# Patient Record
Sex: FEMALE | Race: WHITE | NOT HISPANIC OR LATINO | Employment: OTHER | URBAN - METROPOLITAN AREA
[De-identification: names, ages, dates, MRNs, and addresses within clinical notes are randomized per-mention and may not be internally consistent; named-entity substitution may affect disease eponyms.]

---

## 2022-08-07 PROBLEM — N18.30 STAGE 3 CHRONIC KIDNEY DISEASE: Status: ACTIVE | Noted: 2022-08-07

## 2022-12-24 ENCOUNTER — APPOINTMENT (OUTPATIENT)
Dept: RADIOLOGY | Facility: MEDICAL CENTER | Age: 87
End: 2022-12-24
Attending: EMERGENCY MEDICINE
Payer: COMMERCIAL

## 2022-12-24 ENCOUNTER — HOSPITAL ENCOUNTER (EMERGENCY)
Facility: MEDICAL CENTER | Age: 87
End: 2022-12-24
Attending: EMERGENCY MEDICINE
Payer: COMMERCIAL

## 2022-12-24 VITALS
RESPIRATION RATE: 16 BRPM | HEART RATE: 88 BPM | DIASTOLIC BLOOD PRESSURE: 87 MMHG | TEMPERATURE: 98.4 F | OXYGEN SATURATION: 94 % | SYSTOLIC BLOOD PRESSURE: 157 MMHG | BODY MASS INDEX: 26.8 KG/M2 | HEIGHT: 64 IN | WEIGHT: 157 LBS

## 2022-12-24 DIAGNOSIS — E86.0 DEHYDRATION: ICD-10-CM

## 2022-12-24 DIAGNOSIS — R11.2 NAUSEA AND VOMITING, UNSPECIFIED VOMITING TYPE: ICD-10-CM

## 2022-12-24 LAB
ALBUMIN SERPL BCP-MCNC: 3.9 G/DL (ref 3.2–4.9)
ALBUMIN/GLOB SERPL: 1.3 G/DL
ALP SERPL-CCNC: 109 U/L (ref 30–99)
ALT SERPL-CCNC: 10 U/L (ref 2–50)
ANION GAP SERPL CALC-SCNC: 13 MMOL/L (ref 7–16)
APPEARANCE UR: CLEAR
AST SERPL-CCNC: 18 U/L (ref 12–45)
BASOPHILS # BLD AUTO: 0.5 % (ref 0–1.8)
BASOPHILS # BLD: 0.05 K/UL (ref 0–0.12)
BILIRUB SERPL-MCNC: 1 MG/DL (ref 0.1–1.5)
BILIRUB UR QL STRIP.AUTO: NEGATIVE
BUN SERPL-MCNC: 36 MG/DL (ref 8–22)
CALCIUM ALBUM COR SERPL-MCNC: 9.6 MG/DL (ref 8.5–10.5)
CALCIUM SERPL-MCNC: 9.5 MG/DL (ref 8.5–10.5)
CHLORIDE SERPL-SCNC: 104 MMOL/L (ref 96–112)
CO2 SERPL-SCNC: 21 MMOL/L (ref 20–33)
COLOR UR: YELLOW
CREAT SERPL-MCNC: 1.06 MG/DL (ref 0.5–1.4)
EOSINOPHIL # BLD AUTO: 0.17 K/UL (ref 0–0.51)
EOSINOPHIL NFR BLD: 1.7 % (ref 0–6.9)
ERYTHROCYTE [DISTWIDTH] IN BLOOD BY AUTOMATED COUNT: 44.9 FL (ref 35.9–50)
GFR SERPLBLD CREATININE-BSD FMLA CKD-EPI: 48 ML/MIN/1.73 M 2
GLOBULIN SER CALC-MCNC: 3 G/DL (ref 1.9–3.5)
GLUCOSE SERPL-MCNC: 108 MG/DL (ref 65–99)
GLUCOSE UR STRIP.AUTO-MCNC: NEGATIVE MG/DL
HCT VFR BLD AUTO: 42.9 % (ref 37–47)
HGB BLD-MCNC: 14.2 G/DL (ref 12–16)
IMM GRANULOCYTES # BLD AUTO: 0.03 K/UL (ref 0–0.11)
IMM GRANULOCYTES NFR BLD AUTO: 0.3 % (ref 0–0.9)
KETONES UR STRIP.AUTO-MCNC: ABNORMAL MG/DL
LACTATE SERPL-SCNC: 1.9 MMOL/L (ref 0.5–2)
LEUKOCYTE ESTERASE UR QL STRIP.AUTO: NEGATIVE
LYMPHOCYTES # BLD AUTO: 2.84 K/UL (ref 1–4.8)
LYMPHOCYTES NFR BLD: 28.6 % (ref 22–41)
MCH RBC QN AUTO: 31.6 PG (ref 27–33)
MCHC RBC AUTO-ENTMCNC: 33.1 G/DL (ref 33.6–35)
MCV RBC AUTO: 95.3 FL (ref 81.4–97.8)
MICRO URNS: ABNORMAL
MONOCYTES # BLD AUTO: 0.74 K/UL (ref 0–0.85)
MONOCYTES NFR BLD AUTO: 7.4 % (ref 0–13.4)
NEUTROPHILS # BLD AUTO: 6.11 K/UL (ref 2–7.15)
NEUTROPHILS NFR BLD: 61.5 % (ref 44–72)
NITRITE UR QL STRIP.AUTO: NEGATIVE
NRBC # BLD AUTO: 0 K/UL
NRBC BLD-RTO: 0 /100 WBC
PH UR STRIP.AUTO: 7 [PH] (ref 5–8)
PLATELET # BLD AUTO: 210 K/UL (ref 164–446)
PMV BLD AUTO: 10.7 FL (ref 9–12.9)
POTASSIUM SERPL-SCNC: 4.5 MMOL/L (ref 3.6–5.5)
PROT SERPL-MCNC: 6.9 G/DL (ref 6–8.2)
PROT UR QL STRIP: NEGATIVE MG/DL
RBC # BLD AUTO: 4.5 M/UL (ref 4.2–5.4)
RBC UR QL AUTO: NEGATIVE
SODIUM SERPL-SCNC: 138 MMOL/L (ref 135–145)
SP GR UR STRIP.AUTO: 1.02
UROBILINOGEN UR STRIP.AUTO-MCNC: 1 MG/DL
WBC # BLD AUTO: 9.9 K/UL (ref 4.8–10.8)

## 2022-12-24 PROCEDURE — 99285 EMERGENCY DEPT VISIT HI MDM: CPT

## 2022-12-24 PROCEDURE — 74177 CT ABD & PELVIS W/CONTRAST: CPT

## 2022-12-24 PROCEDURE — U0005 INFEC AGEN DETEC AMPLI PROBE: HCPCS

## 2022-12-24 PROCEDURE — 71045 X-RAY EXAM CHEST 1 VIEW: CPT

## 2022-12-24 PROCEDURE — 700111 HCHG RX REV CODE 636 W/ 250 OVERRIDE (IP)

## 2022-12-24 PROCEDURE — 81003 URINALYSIS AUTO W/O SCOPE: CPT

## 2022-12-24 PROCEDURE — 700105 HCHG RX REV CODE 258: Performed by: EMERGENCY MEDICINE

## 2022-12-24 PROCEDURE — 700117 HCHG RX CONTRAST REV CODE 255: Performed by: EMERGENCY MEDICINE

## 2022-12-24 PROCEDURE — 85025 COMPLETE CBC W/AUTO DIFF WBC: CPT

## 2022-12-24 PROCEDURE — U0003 INFECTIOUS AGENT DETECTION BY NUCLEIC ACID (DNA OR RNA); SEVERE ACUTE RESPIRATORY SYNDROME CORONAVIRUS 2 (SARS-COV-2) (CORONAVIRUS DISEASE [COVID-19]), AMPLIFIED PROBE TECHNIQUE, MAKING USE OF HIGH THROUGHPUT TECHNOLOGIES AS DESCRIBED BY CMS-2020-01-R: HCPCS

## 2022-12-24 PROCEDURE — 87040 BLOOD CULTURE FOR BACTERIA: CPT | Mod: 91

## 2022-12-24 PROCEDURE — 80053 COMPREHEN METABOLIC PANEL: CPT

## 2022-12-24 PROCEDURE — 83605 ASSAY OF LACTIC ACID: CPT

## 2022-12-24 PROCEDURE — 36415 COLL VENOUS BLD VENIPUNCTURE: CPT

## 2022-12-24 PROCEDURE — 96374 THER/PROPH/DIAG INJ IV PUSH: CPT

## 2022-12-24 PROCEDURE — 70450 CT HEAD/BRAIN W/O DYE: CPT

## 2022-12-24 PROCEDURE — 87086 URINE CULTURE/COLONY COUNT: CPT

## 2022-12-24 RX ORDER — TROLAMINE SALICYLATE 10 G/100G
1 CREAM TOPICAL
COMMUNITY

## 2022-12-24 RX ORDER — SODIUM CHLORIDE 9 MG/ML
500 INJECTION, SOLUTION INTRAVENOUS ONCE
Status: COMPLETED | OUTPATIENT
Start: 2022-12-24 | End: 2022-12-24

## 2022-12-24 RX ORDER — MIDAZOLAM HYDROCHLORIDE 1 MG/ML
INJECTION INTRAMUSCULAR; INTRAVENOUS
Status: COMPLETED
Start: 2022-12-24 | End: 2022-12-24

## 2022-12-24 RX ORDER — MIDAZOLAM HYDROCHLORIDE 1 MG/ML
1 INJECTION INTRAMUSCULAR; INTRAVENOUS ONCE
Status: COMPLETED | OUTPATIENT
Start: 2022-12-24 | End: 2022-12-24

## 2022-12-24 RX ADMIN — IOHEXOL 80 ML: 350 INJECTION, SOLUTION INTRAVENOUS at 18:30

## 2022-12-24 RX ADMIN — MIDAZOLAM HYDROCHLORIDE 1 MG: 1 INJECTION INTRAMUSCULAR; INTRAVENOUS at 18:40

## 2022-12-24 RX ADMIN — SODIUM CHLORIDE 500 ML: 9 INJECTION, SOLUTION INTRAVENOUS at 18:19

## 2022-12-24 RX ADMIN — MIDAZOLAM HYDROCHLORIDE 1 MG: 1 INJECTION, SOLUTION INTRAMUSCULAR; INTRAVENOUS at 18:40

## 2022-12-24 ASSESSMENT — FIBROSIS 4 INDEX: FIB4 SCORE: 2.63

## 2022-12-24 NOTE — Clinical Note
Please wait for the point-of-care testing for COVID, RSV, flu.  Do not discharge until that is done and negative

## 2022-12-25 LAB
SARS-COV-2 RNA RESP QL NAA+PROBE: NOTDETECTED
SPECIMEN SOURCE: NORMAL

## 2022-12-25 NOTE — ED PROVIDER NOTES
ED Provider Note    CHIEF COMPLAINT  Decreased activity  Not herself      LIMITATION TO HISTORY   Select: Behavior    HPI  Ashley Carrington is a 89 y.o. female who is here with her nephew.  He provides most of the history as the patient is 89 and has difficulty giving history which is her baseline as per the nephew.  She is here primarily because she is not herself.  He states that she is really less active today.  He describes it as not ambulating as much and being interactive.  Typically her baseline is that she is very active she is cantankerous she is hard of hearing and she is very agitated with altered mental status but this is typical of her.  Today she is less active.  Family was going to take her home for Jelani dinner.  She was at assisted living.  However, because of her lack of activity and because they could not do testing they were recommended to come here.    All that the nephew can tell me is that she vomited this morning and has a decreased appetite and decreased activity.  There is been no notes of diarrhea.  He states that that she had been diagnosed with UTI in the past although he has had no definitive proof that he can recall.  There is no history of head injury.    Nephew is here for outside historian.  In today reviewed the chart the patient has not been hospitalized here.  Possible UTI in the past.      REVIEW OF SYSTEMS  Review of systems are unobtainable due to the patient's past med history of dementia and this is her baseline as per nephew    PAST MEDICAL HISTORY   has a past medical history of Cataracts, bilateral, Dementia (HCC), Glaucoma, Memory loss, Psoriasis, and Renal insufficiency.    SURGICAL HISTORY  patient denies any surgical history    FAMILY HISTORY  No family history on file.    SOCIAL HISTORY  Social History     Tobacco Use    Smoking status: Never    Smokeless tobacco: Never   Substance and Sexual Activity    Alcohol use: Not on file    Drug use: Not on file    Sexual  "activity: Not on file       CURRENT MEDICATIONS  Home Medications       Reviewed by Aleksey Palafox R.N. (Registered Nurse) on 12/24/22 at 1712  Med List Status: <None>     Medication Last Dose Status   acetaminophen (TYLENOL) 325 MG Tab  Active   acetaminophen (TYLENOL) 500 MG Tab  Active   Apoaequorin (PREVAGEN) 10 MG Cap  Active   carboxymethylcellulose (REFRESH TEARS) 0.5 % Solution  Active   diclofenac sodium (VOLTAREN) 1 % Gel  Active   FLUoxetine (PROZAC) 10 MG Cap  Active   gabapentin (NEURONTIN) 100 MG Cap  Active   hydrophilic ointment (AQUAPHOR) Ointment  Active   melatonin 5 mg Tab  Active   meloxicam (MOBIC) 7.5 MG Tab  Active   Multiple Vitamins-Minerals (PRESERVISION AREDS) Cap  Active   triamcinolone acetonide (KENALOG) 0.1 % Cream  Active                    ALLERGIES  Allergies   Allergen Reactions    Sulfa Drugs        PHYSICAL EXAM  VITAL SIGNS: /63   Pulse 79   Temp (!) 35.6 °C (96.1 °F) (Temporal)   Resp 16   Ht 1.626 m (5' 4\")   SpO2 92%   BMI 27.02 kg/m²    General: Elderly female who with glasses in soft restraints.  Resting comfortably but agitated when spoken to  Eyes patient's pupils are reactive.  Ear nose throat oropharynx appears dry.  There is no tongue lacerations noted  Pulmonary lungs are clear bilaterally no wheeze no rales  GI nondistended nontender soft    no dysuria dermatologic no rash abrasions neurologic she moves all extremities.      LABS  Abnormal Labs Reviewed   CBC WITH DIFFERENTIAL - Abnormal; Notable for the following components:       Result Value    MCHC 33.1 (*)     All other components within normal limits   COMP METABOLIC PANEL - Abnormal; Notable for the following components:    Glucose 108 (*)     Bun 36 (*)     Alkaline Phosphatase 109 (*)     All other components within normal limits   URINALYSIS - Abnormal; Notable for the following components:    Ketones Trace (*)     All other components within normal limits    Narrative:     Indication for " culture:->Evaluation for sepsis without a  clear source of infection   ESTIMATED GFR - Abnormal; Notable for the following components:    GFR (CKD-EPI) 48 (*)     All other components within normal limits       RADIOLOGY  I have independently interpreted the diagnostic imaging associated with this visit and am waiting the final reading from the radiologist v you are able to look at x-rays.  X-ray showed no infiltrate or effusion I show this with the nephew and again CT head and CT abdomen are pending.    COURSE & MEDICAL DECISION MAKING  Pertinent Labs & Imaging studies reviewed. (See chart for details)    Is a 89-year-old female who comes in today with a chief complaint of decreased activity sent here from the assisted living facility for concern.  Decreased activity as per family and 1 episode of vomiting earlier in the morning.  Differential exceedingly wide from hematologic infectious causes pneumonia and UTI diverticulitis among others.  She could have metabolic abnormalities such as hyponatremia she could have hematologic Abdomi significant anemia among others    The patient this point my exam has no specific finding.  I find any cellulitis or increased erythema in her extremities.    Plan  CBC  Metabolic panel  Urinalysis.  CT abdomen pelvis noted CT head    Discussed with the great nephew at length.  He is agreeable to the plan  Escalation of care considered, and ultimately not performed: IV fluids.  Point the patient may be just simply dehydrated from a stomach flu or food poisoning.  Therefore the patient may benefit from IV fluids and she has not been able to drink p.o. or follow commands due to her dementia.    No barriers to care at this time    Diagnostic test included CT scans lab work.       7:57 PM  Reviewed the x-ray results of the radiology lab work.  This point the patient had no acute findings unfortunate she did require 1 mg of Versed as the patient was very difficult to get appropriate studies  and tests.  The test ended being negative and I feel the patient can be safely discharged home    I spoken to the nephew at length.  We are waiting for the COVID testing once her COVID testing is done and resulted then we can discharge the patient safely we believe at this point most likely secondary to food poisoning but I do difficult to say but at the patient's age recommended IV fluids.  The patient be discharged home.  Anticipating negative testing.         @HYDRATION: Based on the patient's presentation of Abnormal Fluid Loss and Inability to take oral fluids the patient was given IV fluids. IV Hydration was used because oral hydration was not as rapid as required. Upon recheck following hydration, the patient was improved.@    The patient will not drink alcohol nor drive with prescribed medications. The patient will return for worsening symptoms and is stable at the time of discharge. The patient verbalizes understanding and will comply.    FINAL IMPRESSION  Vomiting  Decreased activity       Electronically signed by: Reji Manley M.D., 12/24/2022 6:08 PM    Asked her to bring her tomorrow if she is not improved.  Or if she worsens.

## 2022-12-25 NOTE — DISCHARGE INSTRUCTIONS
Please come back tomorrow if you are not better  Please come back anytime if you get worse.  Please make sure you are drinking fluids to stay hydrated.

## 2022-12-25 NOTE — ED TRIAGE NOTES
BIBA from memory care for ALOC and agitation. Per EMS pt acting similar to UTI in past.  Baseline aox3. Now aox2 and agitated.  VSS,

## 2022-12-25 NOTE — ED NOTES
Pt unable to hold still in imaging d/t agitaion.  ERP aware and @ CT w/ pt.  Per ERP give 1MG iV versed.  Pt medicated w/ 1 MG iv versed.  Imaging completed and pt back in rm on monitor w/ nephew @ bedside.

## 2022-12-25 NOTE — ED NOTES
Med rec updated and complete. Allergies reviewed and updated. No reactions listed.  Listed provided per [facility]. No [place] arrived with pt.  No antibiotics noted.      [place] [facility]  [phone]

## 2022-12-26 LAB
BACTERIA BLD CULT: ABNORMAL
BACTERIA BLD CULT: ABNORMAL
BACTERIA UR CULT: NORMAL
SIGNIFICANT IND 70042: ABNORMAL
SIGNIFICANT IND 70042: NORMAL
SITE SITE: ABNORMAL
SITE SITE: NORMAL
SOURCE SOURCE: ABNORMAL
SOURCE SOURCE: NORMAL

## 2022-12-27 NOTE — ED NOTES
"ED Positive Culture Follow-up/Notification Note:    Date: 12/27/2022     Patient seen in the ED on 12/24/2022 for decreased activity and feeling as though she is not herself. Patient vomiting earlier the day of presentation, but is afebrile and does not have leukocytosis. No dysuria on exam. Imaging of head, abdomen, and chest are unremarkable. Dehydration is suspected. Urine and blood cultures are taken, and patient is discharged without antibiotics.  1. Dehydration    2. Nausea and vomiting, unspecified vomiting type       Discharge Medication List as of 12/24/2022  8:48 PM          Allergies: Dust mite extract, Other drug, Pollen extract, Sulfa drugs, and Sulfacetamide     Vitals:    12/24/22 1709 12/24/22 1852 12/24/22 1854 12/24/22 2001   BP: 136/63 (!) 174/95  (!) 157/87   Pulse: 79 83  88   Resp: 16 14  16   Temp: (!) 35.6 °C (96.1 °F)   36.9 °C (98.4 °F)   TempSrc: Temporal      SpO2: 92% 97%  94%   Weight:   71.2 kg (157 lb)    Height:           Final cultures:   Results       Procedure Component Value Units Date/Time    Blood Culture [[id number]]  (Abnormal) Collected: 12/24/22 1706    Order Status: Completed Specimen: Blood from Peripheral Updated: 12/26/22 1210     Significant Indicator POS     Source BLD     Site PERIPHERAL     Culture Result Growth detected by Bactec instrument. 12/25/2022  04:34      Bacillus species  Isolated from one set only, please correlate with clinical  condition. Contact the Microbiology department within 48 hr  if identification and susceptibility are needed.  Possible Contaminant      Narrative:      CALL  [facility]  ER tel. ,  CALLED  ER tel.  12/25/2022, 04:35, RB PERF. RESULTS CALLED TO:[phone]  Per Hospital Policy: Only change Specimen Src: to \"Line\" if  specified by physician order.  Right AC    Urine Culture (New) [[id number]] Collected: 12/24/22 1706    Order Status: Completed Specimen: Urine Updated: 12/26/22 0722     Significant Indicator NEG     Source UR     Site -     " "Culture Result No growth at 48 hours.    Narrative:      Indication for culture:->Evaluation for sepsis without a  clear source of infection  Indication for culture:->Evaluation for sepsis without a    SARS-CoV-2, PCR (24 Hour In-House) Collect NP swab in VTM [[id number]] Collected: 12/24/22 1822    Order Status: Completed Specimen: Respirate Updated: 12/25/22 1619     SARS-CoV-2 Source Nasal Swab     SARS-CoV-2 by PCR NotDetected     Comment: [facility] providers: PLEASE REFER TO DE-ESCALATION AND RETESTING PROTOCOL  on [email or web address]    **The Roche SARS-CoV-2 RT-PCR test has been made available for use under the  Emergency Use Authorization (EUA) only.         Blood Culture [[id number]] Collected: 12/24/22 1724    Order Status: Completed Specimen: Blood from Peripheral Updated: 12/25/22 0739     Significant Indicator NEG     Source BLD     Site PERIPHERAL     Culture Result No Growth  Note: Blood cultures are incubated for 5 days and  are monitored continuously.Positive blood cultures  are called to the RN and reported as soon as  they are identified.      Narrative:      Per Hospital Policy: Only change Specimen Src: to \"Line\" if  specified by physician order.  Left Hand    Urinalysis [[id number]]  (Abnormal) Collected: 12/24/22 1706    Order Status: Completed Specimen: Urine Updated: 12/24/22 1733     Color Yellow     Character Clear     Specific Gravity 1.023     Ph 7.0     Glucose Negative mg/dL      Ketones Trace mg/dL      Protein Negative mg/dL      Bilirubin Negative     Urobilinogen, Urine 1.0     Nitrite Negative     Leukocyte Esterase Negative     Occult Blood Negative     Micro Urine Req see below     Comment: Microscopic examination not performed when specimen is clear  and chemically negative for protein, blood, leukocyte esterase  and nitrite.         Narrative:      Indication for culture:->Evaluation for sepsis without a  clear source of infection            Plan:   Urine culture is negative, but blood " culture has grown Bacillus in 1 of 2 sets. This is likely a contaminant. Patient is without indwelling lines, ports, or any other obvious means of entry for Bacillus. Called to discuss result and ask about signs or symptoms of systemic infection. Spoke with healthcare power of . He states the patient has been improving and is being seen regularly by her PCP. This is likely a contaminant, no changes required.    Goldy Rinaldi, MariolaD

## 2022-12-29 LAB
BACTERIA BLD CULT: NORMAL
SIGNIFICANT IND 70042: NORMAL
SITE SITE: NORMAL
SOURCE SOURCE: NORMAL

## 2023-01-05 ENCOUNTER — HOSPITAL ENCOUNTER (OUTPATIENT)
Facility: MEDICAL CENTER | Age: 88
End: 2023-01-05
Attending: PHYSICIAN ASSISTANT
Payer: COMMERCIAL

## 2023-01-05 DIAGNOSIS — R39.9 UTI SYMPTOMS: ICD-10-CM

## 2023-01-05 DIAGNOSIS — R05.1 ACUTE COUGH: ICD-10-CM

## 2023-01-05 PROBLEM — R05.9 COUGH: Status: ACTIVE | Noted: 2023-01-05

## 2023-01-05 PROBLEM — H10.32 ACUTE CONJUNCTIVITIS OF LEFT EYE: Status: ACTIVE | Noted: 2023-01-05

## 2023-01-05 PROBLEM — M54.50 LOW BACK PAIN: Status: ACTIVE | Noted: 2023-01-05

## 2023-01-05 LAB
ALBUMIN SERPL BCP-MCNC: 4.1 G/DL (ref 3.2–4.9)
ALBUMIN/GLOB SERPL: 1.6 G/DL
ALP SERPL-CCNC: 129 U/L (ref 30–99)
ALT SERPL-CCNC: 15 U/L (ref 2–50)
ANION GAP SERPL CALC-SCNC: 16 MMOL/L (ref 7–16)
AST SERPL-CCNC: 20 U/L (ref 12–45)
BASOPHILS # BLD AUTO: 0.5 % (ref 0–1.8)
BASOPHILS # BLD: 0.1 K/UL (ref 0–0.12)
BILIRUB SERPL-MCNC: 1.4 MG/DL (ref 0.1–1.5)
BUN SERPL-MCNC: 46 MG/DL (ref 8–22)
CALCIUM ALBUM COR SERPL-MCNC: 9.2 MG/DL (ref 8.5–10.5)
CALCIUM SERPL-MCNC: 9.3 MG/DL (ref 8.5–10.5)
CHLORIDE SERPL-SCNC: 104 MMOL/L (ref 96–112)
CO2 SERPL-SCNC: 20 MMOL/L (ref 20–33)
CREAT SERPL-MCNC: 1.38 MG/DL (ref 0.5–1.4)
EOSINOPHIL # BLD AUTO: 0.27 K/UL (ref 0–0.51)
EOSINOPHIL NFR BLD: 1.4 % (ref 0–6.9)
ERYTHROCYTE [DISTWIDTH] IN BLOOD BY AUTOMATED COUNT: 48.7 FL (ref 35.9–50)
GFR SERPLBLD CREATININE-BSD FMLA CKD-EPI: 35 ML/MIN/1.73 M 2
GLOBULIN SER CALC-MCNC: 2.5 G/DL (ref 1.9–3.5)
GLUCOSE SERPL-MCNC: 126 MG/DL (ref 65–99)
HCT VFR BLD AUTO: 46 % (ref 37–47)
HGB BLD-MCNC: 14.8 G/DL (ref 12–16)
IMM GRANULOCYTES # BLD AUTO: 0.07 K/UL (ref 0–0.11)
IMM GRANULOCYTES NFR BLD AUTO: 0.4 % (ref 0–0.9)
LYMPHOCYTES # BLD AUTO: 2.85 K/UL (ref 1–4.8)
LYMPHOCYTES NFR BLD: 15 % (ref 22–41)
MCH RBC QN AUTO: 32.2 PG (ref 27–33)
MCHC RBC AUTO-ENTMCNC: 32.2 G/DL (ref 33.6–35)
MCV RBC AUTO: 100 FL (ref 81.4–97.8)
MONOCYTES # BLD AUTO: 1.09 K/UL (ref 0–0.85)
MONOCYTES NFR BLD AUTO: 5.7 % (ref 0–13.4)
NEUTROPHILS # BLD AUTO: 14.68 K/UL (ref 2–7.15)
NEUTROPHILS NFR BLD: 77 % (ref 44–72)
NRBC # BLD AUTO: 0.04 K/UL
NRBC BLD-RTO: 0.2 /100 WBC
PLATELET # BLD AUTO: 257 K/UL (ref 164–446)
PMV BLD AUTO: 11.9 FL (ref 9–12.9)
POTASSIUM SERPL-SCNC: 5.1 MMOL/L (ref 3.6–5.5)
PROT SERPL-MCNC: 6.6 G/DL (ref 6–8.2)
RBC # BLD AUTO: 4.6 M/UL (ref 4.2–5.4)
SODIUM SERPL-SCNC: 140 MMOL/L (ref 135–145)
WBC # BLD AUTO: 19.1 K/UL (ref 4.8–10.8)

## 2023-01-05 PROCEDURE — 80053 COMPREHEN METABOLIC PANEL: CPT

## 2023-01-05 PROCEDURE — 85025 COMPLETE CBC W/AUTO DIFF WBC: CPT

## 2023-01-06 ENCOUNTER — APPOINTMENT (OUTPATIENT)
Dept: RADIOLOGY | Facility: MEDICAL CENTER | Age: 88
End: 2023-01-06
Attending: EMERGENCY MEDICINE
Payer: COMMERCIAL

## 2023-01-06 ENCOUNTER — HOSPITAL ENCOUNTER (EMERGENCY)
Facility: MEDICAL CENTER | Age: 88
End: 2023-01-06
Attending: EMERGENCY MEDICINE
Payer: COMMERCIAL

## 2023-01-06 VITALS
WEIGHT: 155 LBS | SYSTOLIC BLOOD PRESSURE: 150 MMHG | OXYGEN SATURATION: 94 % | BODY MASS INDEX: 26.46 KG/M2 | RESPIRATION RATE: 20 BRPM | DIASTOLIC BLOOD PRESSURE: 70 MMHG | HEIGHT: 64 IN | HEART RATE: 88 BPM | TEMPERATURE: 97.5 F

## 2023-01-06 DIAGNOSIS — M54.50 ACUTE BILATERAL LOW BACK PAIN WITHOUT SCIATICA: ICD-10-CM

## 2023-01-06 DIAGNOSIS — J18.9 PNEUMONIA DUE TO INFECTIOUS ORGANISM, UNSPECIFIED LATERALITY, UNSPECIFIED PART OF LUNG: ICD-10-CM

## 2023-01-06 DIAGNOSIS — N30.00 ACUTE CYSTITIS WITHOUT HEMATURIA: ICD-10-CM

## 2023-01-06 LAB
ALBUMIN SERPL BCP-MCNC: 3.7 G/DL (ref 3.2–4.9)
ALBUMIN/GLOB SERPL: 1.3 G/DL
ALP SERPL-CCNC: 103 U/L (ref 30–99)
ALT SERPL-CCNC: 13 U/L (ref 2–50)
ANION GAP SERPL CALC-SCNC: 11 MMOL/L (ref 7–16)
APPEARANCE UR: CLEAR
AST SERPL-CCNC: 16 U/L (ref 12–45)
BACTERIA #/AREA URNS HPF: ABNORMAL /HPF
BASOPHILS # BLD AUTO: 0.5 % (ref 0–1.8)
BASOPHILS # BLD: 0.07 K/UL (ref 0–0.12)
BILIRUB SERPL-MCNC: 1.1 MG/DL (ref 0.1–1.5)
BILIRUB UR QL STRIP.AUTO: NEGATIVE
BUN SERPL-MCNC: 52 MG/DL (ref 8–22)
CALCIUM ALBUM COR SERPL-MCNC: 9.4 MG/DL (ref 8.5–10.5)
CALCIUM SERPL-MCNC: 9.2 MG/DL (ref 8.5–10.5)
CHLORIDE SERPL-SCNC: 104 MMOL/L (ref 96–112)
CO2 SERPL-SCNC: 24 MMOL/L (ref 20–33)
COLOR UR: YELLOW
CREAT SERPL-MCNC: 1.35 MG/DL (ref 0.5–1.4)
EOSINOPHIL # BLD AUTO: 0.23 K/UL (ref 0–0.51)
EOSINOPHIL NFR BLD: 1.7 % (ref 0–6.9)
EPI CELLS #/AREA URNS HPF: ABNORMAL /HPF
ERYTHROCYTE [DISTWIDTH] IN BLOOD BY AUTOMATED COUNT: 45.1 FL (ref 35.9–50)
FLUAV RNA SPEC QL NAA+PROBE: NEGATIVE
FLUBV RNA SPEC QL NAA+PROBE: NEGATIVE
GFR SERPLBLD CREATININE-BSD FMLA CKD-EPI: 36 ML/MIN/1.73 M 2
GLOBULIN SER CALC-MCNC: 2.9 G/DL (ref 1.9–3.5)
GLUCOSE SERPL-MCNC: 99 MG/DL (ref 65–99)
GLUCOSE UR STRIP.AUTO-MCNC: NEGATIVE MG/DL
HCT VFR BLD AUTO: 41.1 % (ref 37–47)
HGB BLD-MCNC: 13.6 G/DL (ref 12–16)
HYALINE CASTS #/AREA URNS LPF: ABNORMAL /LPF
IMM GRANULOCYTES # BLD AUTO: 0.08 K/UL (ref 0–0.11)
IMM GRANULOCYTES NFR BLD AUTO: 0.6 % (ref 0–0.9)
KETONES UR STRIP.AUTO-MCNC: ABNORMAL MG/DL
LACTATE SERPL-SCNC: 1.2 MMOL/L (ref 0.5–2)
LEUKOCYTE ESTERASE UR QL STRIP.AUTO: ABNORMAL
LYMPHOCYTES # BLD AUTO: 2.08 K/UL (ref 1–4.8)
LYMPHOCYTES NFR BLD: 15.4 % (ref 22–41)
MCH RBC QN AUTO: 31.5 PG (ref 27–33)
MCHC RBC AUTO-ENTMCNC: 33.1 G/DL (ref 33.6–35)
MCV RBC AUTO: 95.1 FL (ref 81.4–97.8)
MICRO URNS: ABNORMAL
MONOCYTES # BLD AUTO: 1.16 K/UL (ref 0–0.85)
MONOCYTES NFR BLD AUTO: 8.6 % (ref 0–13.4)
NEUTROPHILS # BLD AUTO: 9.87 K/UL (ref 2–7.15)
NEUTROPHILS NFR BLD: 73.2 % (ref 44–72)
NITRITE UR QL STRIP.AUTO: NEGATIVE
NRBC # BLD AUTO: 0 K/UL
NRBC BLD-RTO: 0 /100 WBC
PH UR STRIP.AUTO: 5 [PH] (ref 5–8)
PLATELET # BLD AUTO: 217 K/UL (ref 164–446)
PMV BLD AUTO: 10.6 FL (ref 9–12.9)
POTASSIUM SERPL-SCNC: 4.5 MMOL/L (ref 3.6–5.5)
PROT SERPL-MCNC: 6.6 G/DL (ref 6–8.2)
PROT UR QL STRIP: 30 MG/DL
RBC # BLD AUTO: 4.32 M/UL (ref 4.2–5.4)
RBC # URNS HPF: ABNORMAL /HPF
RBC UR QL AUTO: NEGATIVE
RSV RNA SPEC QL NAA+PROBE: NEGATIVE
SARS-COV-2 RNA RESP QL NAA+PROBE: NOTDETECTED
SODIUM SERPL-SCNC: 139 MMOL/L (ref 135–145)
SP GR UR STRIP.AUTO: 1.04
SPECIMEN SOURCE: NORMAL
UROBILINOGEN UR STRIP.AUTO-MCNC: 1 MG/DL
WBC # BLD AUTO: 13.5 K/UL (ref 4.8–10.8)
WBC #/AREA URNS HPF: ABNORMAL /HPF

## 2023-01-06 PROCEDURE — 700102 HCHG RX REV CODE 250 W/ 637 OVERRIDE(OP): Performed by: EMERGENCY MEDICINE

## 2023-01-06 PROCEDURE — 87086 URINE CULTURE/COLONY COUNT: CPT

## 2023-01-06 PROCEDURE — 83605 ASSAY OF LACTIC ACID: CPT

## 2023-01-06 PROCEDURE — 700111 HCHG RX REV CODE 636 W/ 250 OVERRIDE (IP): Performed by: EMERGENCY MEDICINE

## 2023-01-06 PROCEDURE — 700105 HCHG RX REV CODE 258: Performed by: EMERGENCY MEDICINE

## 2023-01-06 PROCEDURE — 99285 EMERGENCY DEPT VISIT HI MDM: CPT

## 2023-01-06 PROCEDURE — 72170 X-RAY EXAM OF PELVIS: CPT

## 2023-01-06 PROCEDURE — 36415 COLL VENOUS BLD VENIPUNCTURE: CPT

## 2023-01-06 PROCEDURE — 85025 COMPLETE CBC W/AUTO DIFF WBC: CPT

## 2023-01-06 PROCEDURE — C9803 HOPD COVID-19 SPEC COLLECT: HCPCS | Performed by: EMERGENCY MEDICINE

## 2023-01-06 PROCEDURE — A9270 NON-COVERED ITEM OR SERVICE: HCPCS | Performed by: EMERGENCY MEDICINE

## 2023-01-06 PROCEDURE — 72131 CT LUMBAR SPINE W/O DYE: CPT

## 2023-01-06 PROCEDURE — 80053 COMPREHEN METABOLIC PANEL: CPT

## 2023-01-06 PROCEDURE — 87040 BLOOD CULTURE FOR BACTERIA: CPT

## 2023-01-06 PROCEDURE — 0241U HCHG SARS-COV-2 COVID-19 NFCT DS RESP RNA 4 TRGT MIC: CPT

## 2023-01-06 PROCEDURE — 81001 URINALYSIS AUTO W/SCOPE: CPT

## 2023-01-06 PROCEDURE — 96374 THER/PROPH/DIAG INJ IV PUSH: CPT

## 2023-01-06 PROCEDURE — 71045 X-RAY EXAM CHEST 1 VIEW: CPT

## 2023-01-06 RX ORDER — LORAZEPAM 1 MG/1
0.5 TABLET ORAL ONCE
Status: COMPLETED | OUTPATIENT
Start: 2023-01-06 | End: 2023-01-06

## 2023-01-06 RX ORDER — CEFDINIR 300 MG/1
300 CAPSULE ORAL 2 TIMES DAILY
Qty: 14 CAPSULE | Refills: 0 | Status: SHIPPED | OUTPATIENT
Start: 2023-01-06 | End: 2023-01-13

## 2023-01-06 RX ORDER — CIPROFLOXACIN HYDROCHLORIDE 3.5 MG/ML
1 SOLUTION/ DROPS TOPICAL EVERY 4 HOURS
Status: SHIPPED | COMMUNITY
End: 2023-02-01

## 2023-01-06 RX ORDER — SODIUM CHLORIDE, SODIUM LACTATE, POTASSIUM CHLORIDE, CALCIUM CHLORIDE 600; 310; 30; 20 MG/100ML; MG/100ML; MG/100ML; MG/100ML
1000 INJECTION, SOLUTION INTRAVENOUS ONCE
Status: COMPLETED | OUTPATIENT
Start: 2023-01-06 | End: 2023-01-06

## 2023-01-06 RX ORDER — CEFTRIAXONE 1 G/1
1000 INJECTION, POWDER, FOR SOLUTION INTRAMUSCULAR; INTRAVENOUS ONCE
Status: COMPLETED | OUTPATIENT
Start: 2023-01-06 | End: 2023-01-06

## 2023-01-06 RX ORDER — LIDOCAINE HYDROCHLORIDE AND EPINEPHRINE BITARTRATE 20; .01 MG/ML; MG/ML
10 INJECTION, SOLUTION SUBCUTANEOUS ONCE
Status: DISCONTINUED | OUTPATIENT
Start: 2023-01-06 | End: 2023-01-06

## 2023-01-06 RX ORDER — ACETAMINOPHEN 325 MG/1
650 TABLET ORAL 3 TIMES DAILY
Status: SHIPPED | COMMUNITY
End: 2023-01-23 | Stop reason: SDUPTHER

## 2023-01-06 RX ADMIN — CEFTRIAXONE SODIUM 1000 MG: 1 INJECTION, POWDER, FOR SOLUTION INTRAMUSCULAR; INTRAVENOUS at 13:39

## 2023-01-06 RX ADMIN — SODIUM CHLORIDE, POTASSIUM CHLORIDE, SODIUM LACTATE AND CALCIUM CHLORIDE 1000 ML: 600; 310; 30; 20 INJECTION, SOLUTION INTRAVENOUS at 13:40

## 2023-01-06 RX ADMIN — LORAZEPAM 0.5 MG: 1 TABLET ORAL at 16:00

## 2023-01-06 ASSESSMENT — FIBROSIS 4 INDEX: FIB4 SCORE: 1.95

## 2023-01-06 ASSESSMENT — PAIN DESCRIPTION - PAIN TYPE: TYPE: ACUTE PAIN

## 2023-01-06 NOTE — ED TRIAGE NOTES
".  Chief Complaint   Patient presents with   • Abnormal Labs   • ALANGELICA LEGGETT from [facility]. Per EMS, facility called 911 due to patient's \"abnormal labs\" and increased confusion, but EMS did not know what labs were abnormal, or what patient's baseline mental status is. Patient has a history of dementia, A/Ox1. Patient is very agitated and wants to go home. Facility reported that patient has had an increase in confusion the last 3 days.     Blood glucose en route 150. Patient denies any pain or discomfort.     Ht 1.626 m (5' 4\")   Wt 70.3 kg (155 lb)   BMI 26.61 kg/m²     "

## 2023-01-06 NOTE — ED NOTES
Patient saturating 88% on RA, placed on 2L Nasal cannula. Per [facility] paperwork, patient wears 2 liters baseline at night.

## 2023-01-07 NOTE — ED PROVIDER NOTES
ED Provider Note    CHIEF COMPLAINT  Chief Complaint   Patient presents with    Abnormal Labs    ALOC       HEIDI/ROS    Ashley Carrington is a 89 y.o. female who presents to the emergency department brought in by ambulance reportedly due to abnormal lab testing but the EMS crew who delivered the patient does not know what these abnormalities were.  The patient herself is not a great historian I think that she likely has some dementia she states that she feels fine and really has no complaints and does not know why she was brought here.    Chart review shows that the patient had blood work done yesterday and the white blood cell count was elevated at 19.2 and the BUN was 49 with a normal creatinine.  In addition office notes indicate that the patient was recently treated with Omnicef for concern about possible urinary tract infection although urine obtained at that time was culture negative after 5 days.    The patient herself offers no additional complaints.  I have spoken with her family at the bedside and on the cell phone and they tell me that several days ago the patient did fall at home her doctor ordered x-rays of the pelvis and lumbar spine these were never obtained and they would like some done today.  The patient remains ambulatory.    PAST MEDICAL HISTORY   has a past medical history of Cataracts, bilateral, Dementia (HCC), Glaucoma, Memory loss, Psoriasis, and Renal insufficiency.    SURGICAL HISTORY  patient denies any surgical history    FAMILY HISTORY  No family history on file.    SOCIAL HISTORY  Social History     Tobacco Use    Smoking status: Never    Smokeless tobacco: Never   Substance and Sexual Activity    Alcohol use: Not on file    Drug use: Not on file    Sexual activity: Not on file       CURRENT MEDICATIONS  Home Medications       Reviewed by Jamila Lopez (Pharmacy Tech) on 01/06/23 at 1520  Med List Status: Complete     Medication Last Dose Status   acetaminophen (TYLENOL) 325 MG Tab  "1/6/2023 Active   Apoaequorin (PREVAGEN) 10 MG Cap 1/6/2023 Active   carboxymethylcellulose (REFRESH TEARS) 0.5 % Solution 1/6/2023 Active   cefdinir (OMNICEF) 300 MG Cap 1/6/2023 Active   ciprofloxacin (CILOXIN) 0.3 % Solution 1/6/2023 Active   diclofenac sodium (VOLTAREN) 1 % Gel 1/6/2023 Active   FLUoxetine (PROZAC) 10 MG Cap 1/6/2023 Active   gabapentin (NEURONTIN) 100 MG Cap 1/6/2023 Active   hydrophilic ointment (AQUAPHOR) Ointment 1/6/2023 Active   melatonin 5 mg Tab 1/5/2023 Active   meloxicam (MOBIC) 7.5 MG Tab 1/6/2023 Active   Multiple Vitamins-Minerals (PRESERVISION AREDS) Cap 1/6/2023 Active   Probiotic, Lactobacillus, Cap 1/6/2023 Active   triamcinolone acetonide (KENALOG) 0.1 % Cream 1/6/2023 Active   trolamine salicylate (ASPERCREME OR MYOFLEX) 10 % cream PRN Active                    ALLERGIES  Allergies   Allergen Reactions    Dust Mite Extract      No reaction listed    Other Drug      Perfume     No reaction listed    Pollen Extract      No reaction listed    Sulfa Drugs      No reaction listed    Sulfacetamide      No reaction listed       PHYSICAL EXAM  VITAL SIGNS: /65   Pulse 78   Temp 36.4 °C (97.6 °F) (Temporal)   Resp 20   Ht 1.626 m (5' 4\")   Wt 70.3 kg (155 lb)   SpO2 95%   BMI 26.61 kg/m²    Constitutional: Patient is awake she is talkative she does not appear toxic or distressed she is not a very good historian as noted above but she certainly does not appear ill  HENT: No sign of trauma to the head, mucous membranes are dry  Eyes: No erythema discharge or jaundice  Neck: Trachea midline no JVD no C-spine tenderness  Cardiovascular: Regular rate and rhythm  Respiratory: Clear bilaterally with no apparent difficulty breathing  Abdomen: Soft nontender nondistended no rebound guarding or peritoneal findings.  Because of the elevated BUN digital rectal examination was performed to rule out GI bleeding and this was discussed with family at the bedside and over the telephone and " family members and patient consent and the exam was done with nurse chaperone at the bedside and was unremarkable and showed brown heme-negative stool on the guaiac card  Back: I will see any marks or bruises on the back I do not find any tenderness with palpation  Skin: Warm and dry  Musculoskeletal: No acute bony deformity the patient has good range of motion of the lower extremities particularly around both hips without any pain or discomfort and as mentioned above she is up and ambulatory in the ER  Neurologic: Awake and verbal and moving all extremities and ambulatory      DIAGNOSTIC STUDIES / PROCEDURES    LABS  CBC shows an elevated white blood cell count of 13.5 and this does appear to be in improvement from the value yesterday of 19.1.  Hemoglobin is 13.6 yesterday it was 14.8.  Pleat metabolic panel shows a BUN of 52 yesterday's value was 46 creatinine is normal at 1.35.  Urinalysis was obtained by catheterization of the bladder and shows nitrite negative small leukocyte Estrace with 10-20 white blood cells and a few bacteria in the microscopic exam.  I have ordered and add on urine culture.  Viral testing is negative for COVID influenza and RSV    RADIOLOGY  DX-PELVIS-1 OR 2 VIEWS   Final Result      1.  Osteoporosis.      2.  Moderate levoscoliosis lumbar spine.      3.  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      DX-CHEST-PORTABLE (1 VIEW)   Final Result      No acute cardiac or pulmonary abnormalities are identified.      CT-LSPINE W/O PLUS RECONS    (Results Pending)   CT lumbar spine is pending at this time      COURSE & MEDICAL DECISION MAKING    In the emergency department an IV was established and the patient was placed on the cardiac monitor.  Because of the elevated BUN the rectal examination was performed as noted above and is heme-negative so I do not think this can be attributed to GI bleeding.  I think the patient might not be taking adequate oral intake and is likely  dehydrated so intravenous fluids were administered.  Because of the abnormal urinalysis which was obtained by catheterization of the bladder the patient was given empiric ceftriaxone and a culture was ordered.  As noted above I reviewed all of the above information in detail with the patient and her family and at this point in time I think it is safe for her to go home I am going to continue outpatient antibiotics with a 7-day prescription for Omnicef and I think that these could be discontinued if the urine culture is reported as negative in 2 days.  The family is to contact their primary care doctor soon as possible and arrange office recheck during the week and the patient may be returned here if there are new or worsening symptoms.      FINAL DIAGNOSIS  Dehydration  Abnormal urinalysis rule out acute cystitis by culture  Recent fall       Electronically signed by: Manish Bueno M.D., 1/6/2023 4:18 PM    Addendum: CT of the lumbar spine shows chronic findings but no acute fractures

## 2023-01-07 NOTE — ED NOTES
Patient discharged per order. Oral and written discharge instructions reviewed with nephew. IV removed. Medications sent to home pharmacy. New medications reviewed. All belongings accounted for and taken with patient. Questions answered. Patient escorted to lobby. Encouraged to follow up with PCP.

## 2023-01-07 NOTE — ED PROVIDER NOTES
ED Provider Note Addendum    Scribed for No att. providers found by Angelia Rosario M.D. on 1/6/2023 at 11:28 PM.     This is an addendum to the note on Ashley Zeb.  For further details and full chart information, see patient's initial note.       11:28 PM - I discussed the patient's case with Dr. Card (ERP) who will transfer care of the patient to me at this time.      Plan was to await CT results.  If there is no evidence of acute fracture patient will be discharged per Dr. Card's instructions.    CT-LSPINE W/O PLUS RECONS   Final Result      1.  Moderate levoscoliosis of the upper lumbar spine.      2.  Moderate discal degenerative changes of the mid and upper lumbar spine with moderate marginal osteophytosis.      3.  Grade 1 spondylolisthesis at the L4-5 level with moderate central canal stenosis at the L3-4 and L4-5 level secondary to facet arthropathy.      4.  No evidence of acute fracture or subluxation in the lumbar region.      DX-PELVIS-1 OR 2 VIEWS   Final Result      1.  Osteoporosis.      2.  Moderate levoscoliosis lumbar spine.      3.  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      DX-CHEST-PORTABLE (1 VIEW)   Final Result      No acute cardiac or pulmonary abnormalities are identified.        CT imaging shows chronic degenerative changes and spinal canal stenosis but no acute findings.  Patient has been up and ambulating.  Patient is stable for outpatient management.  Dr. Card is treating the urinary tract infection.  His note for full disposition instructions.    Disposition:  Charged home in stable condition    FINAL IMPRESSION  1. Acute cystitis without hematuria    2. Pneumonia due to infectious organism, unspecified laterality, unspecified part of lung    3. Acute bilateral low back pain without sciatica        The note accurately reflects work and decisions made by me.  Angelia Rosario M.D.  1/6/2023  11:29 PM

## 2023-01-07 NOTE — DISCHARGE INSTRUCTIONS
Drink lots of fluids to improve your hydration status.  Call your primary care doctor and arrange office recheck during the week.  Return here if there are new or worsening symptoms

## 2023-01-07 NOTE — ED NOTES
Spoke with patient's niece Lakeisha, her brother Ra is on his way to  the patient. Patient will be discharged. Updated Lakeisha on plan of care.

## 2023-01-07 NOTE — ED NOTES
Patient is agitated, getting out of bed, removing monitoring equipment, ambulating unsteadily in the room. Informed MD. Patient given one dose of oral ativan.

## 2023-01-08 LAB
BACTERIA UR CULT: NORMAL
SIGNIFICANT IND 70042: NORMAL
SITE SITE: NORMAL
SOURCE SOURCE: NORMAL

## 2023-01-11 LAB
BACTERIA BLD CULT: NORMAL
BACTERIA BLD CULT: NORMAL
SIGNIFICANT IND 70042: NORMAL
SIGNIFICANT IND 70042: NORMAL
SITE SITE: NORMAL
SITE SITE: NORMAL
SOURCE SOURCE: NORMAL
SOURCE SOURCE: NORMAL

## 2023-03-10 PROBLEM — G47.34 NOCTURNAL HYPOXEMIA: Status: ACTIVE | Noted: 2023-03-10
